# Patient Record
Sex: MALE | Race: ASIAN | NOT HISPANIC OR LATINO | URBAN - METROPOLITAN AREA
[De-identification: names, ages, dates, MRNs, and addresses within clinical notes are randomized per-mention and may not be internally consistent; named-entity substitution may affect disease eponyms.]

---

## 2020-01-11 ENCOUNTER — EMERGENCY (EMERGENCY)
Facility: HOSPITAL | Age: 50
LOS: 1 days | Discharge: HOME | End: 2020-01-11
Attending: STUDENT IN AN ORGANIZED HEALTH CARE EDUCATION/TRAINING PROGRAM | Admitting: EMERGENCY MEDICINE
Payer: SELF-PAY

## 2020-01-11 VITALS
DIASTOLIC BLOOD PRESSURE: 88 MMHG | TEMPERATURE: 98 F | RESPIRATION RATE: 16 BRPM | SYSTOLIC BLOOD PRESSURE: 135 MMHG | OXYGEN SATURATION: 100 % | HEART RATE: 81 BPM

## 2020-01-11 PROCEDURE — 99284 EMERGENCY DEPT VISIT MOD MDM: CPT

## 2020-01-11 PROCEDURE — 70480 CT ORBIT/EAR/FOSSA W/O DYE: CPT | Mod: 26

## 2020-01-11 RX ORDER — OFLOXACIN 0.3 %
1 DROPS OPHTHALMIC (EYE) ONCE
Refills: 0 | Status: COMPLETED | OUTPATIENT
Start: 2020-01-11 | End: 2020-01-11

## 2020-01-11 RX ORDER — FLUORESCEIN SODIUM 9 MG
1 STRIP OPHTHALMIC (EYE) ONCE
Refills: 0 | Status: COMPLETED | OUTPATIENT
Start: 2020-01-11 | End: 2020-01-11

## 2020-01-11 RX ADMIN — Medication 1 APPLICATION(S): at 10:11

## 2020-01-11 RX ADMIN — Medication 1 DROP(S): at 10:10

## 2020-01-11 RX ADMIN — Medication 1 DROP(S): at 16:46

## 2020-01-11 NOTE — CONSULT NOTE ADULT - SUBJECTIVE AND OBJECTIVE BOX
[facility] DEPARTMENT OF OPHTHALMOLOGY - INITIAL ADULT CONSULT  -----------------------------------------------------------------------------------------------------------------  Hamilton Huang MD PGY-2  Pager: [phone]/[facility]: [id number]  -----------------------------------------------------------------------------------------------------------------    HPI: Danish  used initially until wife arrived and provided translation in accordance with patient's wishes.   49 yr old M presenting with bilateral eye pain. Pt reports he was cutting metal Thursday when he started to develop eye pain. Tried OTC tear remedy and eye wash with little to no relief.  Pt denies any changes in vision, fever, chills, or associated headache. Pain improves with anesthetic drop on ED testing.       PAST MEDICAL & SURGICAL HISTORY: denies    Past Ocular History: denies    FAMILY HISTORY: denies    Social History: denies    Ophthalmic Medications: tried eye wash, and red reliever/itchiness reliever drops         Allergies & Intolerances:   Nuts (Anaphylaxis)    Review of Systems:  Constitutional: No fever, chills  Eyes: No  flashes, floaters,  erythema, discharge, double vision, OU  Neuro: No tremors  Cardiovascular: No chest pain, palpitations  Respiratory: No SOB, no cough  GI: No nausea, vomiting, abdominal pain  : No dysuria  Skin: no rash  Psych: no depression  Endocrine: no polyuria, polydipsia  Heme/lymph: no swelling    VITALS: T(C): 36.9 (01-11-20 @ 08:56)  T(F): 98.5 (01-11-20 @ 08:56), Max: 98.5 (01-11-20 @ 08:56)  HR: 81 (01-11-20 @ 08:56) (81 - 81)  BP: 135/88 (01-11-20 @ 08:56) (135/88 - 135/88)  RR:  (16 - 16)  SpO2:  (100% - 100%)  Wt(kg): --  General: AAO x 3, appropriate mood and affect    Ophthalmology Exam:  Visual acuity (sc): 20/30 OD 20/20 OS  Pupils: PERRL OU, no APD  Ttono: 15 OD 18 OS  Extraocular movements (EOMs): Full OU, no pain, no diplopia  Confrontational Visual Field (CVF): Full OU  Color Plates: 12/12 OU    Slit Lamp Exam (SLE)  External: WNL OU  Lids/Lashes/Lacrimal Ducts: WNL OU lids everted and fornices swept no FBs    Sclera/Conjunctiva: 1+ inj OD W+Q OS  Cornea: small metallic FB with rust ring paracentral towards 5 o clock OD 1+ spk OU  Anterior Chamber: quiet without cells/flare OU    Iris: Reactive OU  Lens: NS OU     Fundus Exam: dilated with 1% tropicamide and 2.5% phenylephrine  Approval obtained from primary team for dilation  Patient aware that pupils can remained dilated for at least 4-6 hours  Exam performed with 20D lens    Vitreous: wnl OU  Disc, cup/disc: sharp and pink, 0.4 OU  Macula: wnl OU  Vessels: wnl OU  Periphery: wnl OU    Labs/Imaging:  ***

## 2020-01-11 NOTE — ED PROVIDER NOTE - OBJECTIVE STATEMENT
49 yr old M presenting with bilateral eye pain. Pt reports he was cutting metal yesterday when he started to develop eye pain. Pt denies any changes in vision, fever, chills, or associated headache.

## 2020-01-11 NOTE — ED PROVIDER NOTE - PROGRESS NOTE DETAILS
Young: CT orbit negative however I have concern of a retain foreign body, Optho consulted. [place]: Ophtho removed FB, gave recommendations for abx eye gtts and artificial tears.  Will discharge home.

## 2020-01-11 NOTE — CONSULT NOTE ADULT - ATTENDING COMMENTS
Agree with note. I have not examined patient but patient should follow up with [facility] ophthalology within 1 week of discharge. Agree with note. I have not examined patient but patient should follow up with [facility] ophthalmology within 1 day of discharge.

## 2020-01-11 NOTE — ED PROVIDER NOTE - PATIENT PORTAL LINK FT
You can access the FollowMyHealth Patient Portal offered by [facility] by registering at the following website: [email or web address]. By joining [facility] [facility]’s FollowMyHealth portal, you will also be able to view your health information using other applications (apps) compatible with our system.

## 2020-01-11 NOTE — CONSULT NOTE ADULT - ASSESSMENT
Assessment and Recommendations:  49y male w/o  pmhx/ochx found to have K foreign body. After r/b/a discussed, removed K foreign body at slit lamp, and removed rust but unable to remove deeper particles. No signs of inflammation to warrant cycloplegia. Plan to treat as follows:    1. K foreign body, of R eye  - ofloxacin 1 drop qid to the R eye  - artificial tears 1 drop qid to both eyes  - f/u Monday 9AM for further rust removal and follow-up    Outpatient follow-up: Patient should follow-up with his/her ophthalmologist or with [facility] Department of Ophthalmology as described at :    [address] [place] [address]. [address]  [place] [address]  [phone]    Hamilton Huang MD, PGY-2  Pager: [phone]/[facility]: [id number]

## 2020-01-13 ENCOUNTER — APPOINTMENT (OUTPATIENT)
Dept: OPHTHALMOLOGY | Facility: CLINIC | Age: 50
End: 2020-01-13

## 2020-01-13 PROBLEM — Z78.9 OTHER SPECIFIED HEALTH STATUS: Chronic | Status: ACTIVE | Noted: 2020-01-12

## 2020-01-17 ENCOUNTER — APPOINTMENT (OUTPATIENT)
Dept: OPHTHALMOLOGY | Facility: CLINIC | Age: 50
End: 2020-01-17

## 2020-01-24 ENCOUNTER — APPOINTMENT (OUTPATIENT)
Dept: OPHTHALMOLOGY | Facility: CLINIC | Age: 50
End: 2020-01-24

## 2020-01-31 ENCOUNTER — APPOINTMENT (OUTPATIENT)
Dept: OPHTHALMOLOGY | Facility: CLINIC | Age: 50
End: 2020-01-31